# Patient Record
Sex: FEMALE | Race: WHITE
[De-identification: names, ages, dates, MRNs, and addresses within clinical notes are randomized per-mention and may not be internally consistent; named-entity substitution may affect disease eponyms.]

---

## 2017-10-21 ENCOUNTER — HOSPITAL ENCOUNTER (EMERGENCY)
Dept: HOSPITAL 54 - ER | Age: 32
Discharge: HOME | End: 2017-10-21
Payer: COMMERCIAL

## 2017-10-21 VITALS — SYSTOLIC BLOOD PRESSURE: 123 MMHG | DIASTOLIC BLOOD PRESSURE: 57 MMHG

## 2017-10-21 VITALS — HEIGHT: 63 IN | BODY MASS INDEX: 31.36 KG/M2 | WEIGHT: 177 LBS

## 2017-10-21 DIAGNOSIS — R21: Primary | ICD-10-CM

## 2017-10-21 PROCEDURE — A4606 OXYGEN PROBE USED W OXIMETER: HCPCS

## 2017-10-21 PROCEDURE — Z7610: HCPCS

## 2017-10-21 PROCEDURE — 99283 EMERGENCY DEPT VISIT LOW MDM: CPT

## 2017-10-21 NOTE — NUR
Pt c/o insect bites on both legs, multiple red lesions from scratching noted as 
well as white bumps, itching, but no pain.  Pt states that exterminators 
sprayed for mosquitos and mites on Tuesday, these started after that.  Pt 
denies CP, SOB, dizziness, n/v, no other complaints, no distress noted.

## 2019-01-07 ENCOUNTER — HOSPITAL ENCOUNTER (EMERGENCY)
Dept: HOSPITAL 54 - ER | Age: 34
Discharge: HOME | End: 2019-01-07
Payer: MEDICAID

## 2019-01-07 VITALS — DIASTOLIC BLOOD PRESSURE: 66 MMHG | SYSTOLIC BLOOD PRESSURE: 124 MMHG

## 2019-01-07 VITALS — HEIGHT: 63 IN | BODY MASS INDEX: 31.01 KG/M2 | WEIGHT: 175 LBS

## 2019-01-07 DIAGNOSIS — H57.89: Primary | ICD-10-CM
